# Patient Record
(demographics unavailable — no encounter records)

---

## 2025-03-20 NOTE — HISTORY OF PRESENT ILLNESS
[Never] : never [TextBox_4] : 69F with hx of osteoporosis, sciatica who presents for chronic cough.   PCP: Tia Peralta   Recently saw PCP Dr. Peralta.  Saw Dr. Agarwal in 2023 to establish care after following with Dr Garcia for sarcoidosis. Patient was diagnosed with sarcoidosis many years ago in [place]. She recalls having had possible bronchoscopy with Bx to determine diagnosis. Pt's daughter also reports that maybe 20 yrs ago she had issues with joint pains and was told that she had RA at that time. However, she never received treatment for RA and her joint pains resolved and never had any joint malformations c/w RA. She believes that she may have had sarcoidosis at that time but was not diagnosed until much later. For her sarcoidosis, she was on prednisone and plaquenil for some time and then was weaned off prednisone and self d/c'd plaquenil as she had minimal symptoms. Her only complaint is a long-standing cough for >10 yrs of her life. She does not recall that sarcoidosis treatment significantly improved her cough. She generally wakes up in the morning with sensation of phlegm/sputum production and coughs it up. Then the cough goes away. She also notes general sensitivity to strong smells and perfumes. At that time PFTs were ordered. CT chest reports from [place] showed e/o mild mucosal congestion in her nose and CT chest done [facility] 2020 showed overall stability in disease. At the time cough was thought more likely due to PND, ordered flonase. No follow up after that.   Today,  Daughter is with her and helps with translation some as pt speaks Maori.  - here to ensure they are doing all the needed follow up for sarcoidosis  - still has productive cough in the AM, but sometimes has random cough throughout the day and very sensitive to perfumes and odors  - still with sinus congestion and rhinitis, does def get PND  - isnt using flonaes, allergy pill, on a scale of bothersome it's a 6/10  Sarcoid ROS: Dyspnea/chest pain/hemoptysis- none  Weight loss/fever/night sweats- none Rash- none  Eye pain/visual disturbance- no eye pain, soemtimes feels slight pressure  Numbness/tingling- sometimes tingling in hands and feet-- comes and goes and is not frequent  Lightheadness/syncope- none  Abd pain- none  Social hx  Nonsmoker 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  eral sensitivity to strong smells and perfumes.  Fam hx  None

## 2025-03-20 NOTE — HISTORY OF PRESENT ILLNESS
[Never] : never [TextBox_4] : 69F with hx of osteoporosis, sciatica who presents for chronic cough.   PCP: Tia Peralta   Recently saw PCP Dr. Peralta.  Saw Dr. Agarwal in 2023 to establish care after following with Dr Garcia for sarcoidosis. Patient was diagnosed with sarcoidosis many years ago in [place]. She recalls having had possible bronchoscopy with Bx to determine diagnosis. Pt's daughter also reports that maybe 20 yrs ago she had issues with joint pains and was told that she had RA at that time. However, she never received treatment for RA and her joint pains resolved and never had any joint malformations c/w RA. She believes that she may have had sarcoidosis at that time but was not diagnosed until much later. For her sarcoidosis, she was on prednisone and plaquenil for some time and then was weaned off prednisone and self d/c'd plaquenil as she had minimal symptoms. Her only complaint is a long-standing cough for >10 yrs of her life. She does not recall that sarcoidosis treatment significantly improved her cough. She generally wakes up in the morning with sensation of phlegm/sputum production and coughs it up. Then the cough goes away. She also notes general sensitivity to strong smells and perfumes. At that time PFTs were ordered. CT chest reports from [place] showed e/o mild mucosal congestion in her nose and CT chest done [facility] 2020 showed overall stability in disease. At the time cough was thought more likely due to PND, ordered flonase. No follow up after that.   Today,  Daughter is with her and helps with translation some as pt speaks Romansh.  - here to ensure they are doing all the needed follow up for sarcoidosis  - still has productive cough in the AM, but sometimes has random cough throughout the day and very sensitive to perfumes and odors  - still with sinus congestion and rhinitis, does def get PND  - isnt using flonaes, allergy pill, on a scale of bothersome it's a 6/10  Sarcoid ROS: Dyspnea/chest pain/hemoptysis- none  Weight loss/fever/night sweats- none Rash- none  Eye pain/visual disturbance- no eye pain, soemtimes feels slight pressure  Numbness/tingling- sometimes tingling in hands and feet-- comes and goes and is not frequent  Lightheadness/syncope- none  Abd pain- none  Social hx  Nonsmoker 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  eral sensitivity to strong smells and perfumes.  Fam hx  None

## 2025-03-20 NOTE — RESULTS/DATA
[TextEntry] : CT high res 2020 IMPRESSION: Areas of nodularity including tree-in-bud-like micronodules and areas of pleural parenchymal fibronodular residuals as described above in this patient with mediastinal adenopathy some of which appear calcified/hyperdense. These findings may be consistent with a given history of prior sarcoidosis. Other infectious/inflammatory etiology cannot be excluded. In addition in the correct clinical setting concomitant underlying metastatic deposits may be considered.  Recommend follow-up to include comparison to prior studies if available and short interval follow- up CT in 3 months time.

## 2025-04-02 NOTE — PHYSICAL EXAM
[TextEntry] : General: alert, well appearing, no distress HEENT: no hair thinning or alopecia, clear conjunctiva, no oral or nasal ulcers, no cervical lymphadenopathy Cardiac: S1+, S2+,normal rate and rhythm, no murmur, rubs or gallops Pulm: normal respiratory effort, clear to auscultation bilaterally GI: abdomen soft, non-tender and non-distended   MSK: Hand-DIP joints, PIP joints, MCP joints, CMC joints without evidence of synovitis or effusion. Intact and nonpainful range of motion.  Tenderness of first CMC joint right  +Heberden/Desmond nodes. Wrist, elbow, and shoulder joints without evidence of synovitis or effusion. Intact and nonpainful range of motion. Foot/ankle/knee/hip joints without erythema/effusion/tenderness to palpation and with intact nonpainful range of motion. Spine: normal appearance, no tenderness, normal ROM   Skin: No rashes, warm and well-perfused. No nail pitting, digital ulceration, dactylitis, or telangiectasias. No subcutaneous nodules.

## 2025-04-02 NOTE — REVIEW OF SYSTEMS
[TextEntry] :  Head: No Alopecia, no scalp pain, no temporal headaches, no hearing loss, no red/painful eyes, no dry eyes ,no dry mouth, no painless oral ulcers,no poor dentition,no jaw claudication ENT: No enlarged lymph nodes, no parotid swelling,no dysphagia CVS: No Positional chest pain Pulm: No SOB, + cough, no hemoptysis, no pleuritic chest pain Abdomen:  No constipation,no diarrhea, no postprandial pain Skin: No rash,no dry skin,no tight skin,no nodules, no Raynaud's MSK: + joint pain,no joint swelling,no morning stiffness, no back pain :  No spontaneous , no blood in urine Heme: No clots,no hx of low WBC,no hx of low Hb,no hx of low platelets Neuro: No tingling,no numbness,no foot drop, no weakness, no seizures, no temporal headaches Systemic: No fevers, no weight loss, no night sweats No H/o radiation therapy, no recent hospitalization

## 2025-04-02 NOTE — HISTORY OF PRESENT ILLNESS
[FreeTextEntry1] : 68yo F with PMHx of sarcoidosis(biopsy in 2018), ?RA, osteoporosis presents to establish care.  30yrs ago, she was diagnosed to have sarcoidosis when she presented with cough. Says she had biopsy from lung which helped to diagnose sarcoidosis. She was on prednisone for many years but then it was stopped few years ago. Unclear why. Prednisone helped with cough. No difficulty breathing. She does not have cough persistently. No longer on HCQ.  Joint pains: Pain of her hands, PIP joints mainly; worse with cleaning or activity. Otherwise no pain. No am stiffness. No prolonged stiffness.  Initially, in [place], they had thought she has RA.  But she has never been on medications for RA. She is on treatment for osteoporosis with endocrinology. She gets cough when she lies down.  She has a feeling that something is coming up to her mouth and then she coughs.  Patient was diagnosed with sarcoidosis many years ago in [place]. Her only complaint is a long-standing cough for >10 yrs of her life. She does not recall that sarcoidosis treatment significantly improved her cough. She generally wakes up in the morning with sensation of phlegm/sputum production and coughs it up. Then the cough goes away.  At that time PFTs were ordered. CT chest reports from [place] showed e/o mild mucosal congestion in her nose and CT chest done [facility] 2020 showed overall stability in disease. At the time cough was thought more likely due to PND, ordered flonase. No follow up after that.  workup: CT chest feb 2025: 8mm ground glass nodule, ?ILD no LAD

## 2025-04-02 NOTE — HISTORY OF PRESENT ILLNESS
[FreeTextEntry1] : 70yo F with PMHx of sarcoidosis(biopsy in 2018), ?RA, osteoporosis presents to establish care.  30yrs ago, she was diagnosed to have sarcoidosis when she presented with cough. Says she had biopsy from lung which helped to diagnose sarcoidosis. She was on prednisone for many years but then it was stopped few years ago. Unclear why. Prednisone helped with cough. No difficulty breathing. She does not have cough persistently. No longer on HCQ.  Joint pains: Pain of her hands, PIP joints mainly; worse with cleaning or activity. Otherwise no pain. No am stiffness. No prolonged stiffness.  Initially, in [place], they had thought she has RA.  But she has never been on medications for RA. She is on treatment for osteoporosis with endocrinology. She gets cough when she lies down.  She has a feeling that something is coming up to her mouth and then she coughs.  Patient was diagnosed with sarcoidosis many years ago in [place]. Her only complaint is a long-standing cough for >10 yrs of her life. She does not recall that sarcoidosis treatment significantly improved her cough. She generally wakes up in the morning with sensation of phlegm/sputum production and coughs it up. Then the cough goes away.  At that time PFTs were ordered. CT chest reports from [place] showed e/o mild mucosal congestion in her nose and CT chest done [facility] 2020 showed overall stability in disease. At the time cough was thought more likely due to PND, ordered flonase. No follow up after that.  workup: CT chest feb 2025: 8mm ground glass nodule, ?ILD no LAD